# Patient Record
Sex: FEMALE | Race: ASIAN | NOT HISPANIC OR LATINO | URBAN - METROPOLITAN AREA
[De-identification: names, ages, dates, MRNs, and addresses within clinical notes are randomized per-mention and may not be internally consistent; named-entity substitution may affect disease eponyms.]

---

## 2023-05-27 ENCOUNTER — EMERGENCY (EMERGENCY)
Facility: HOSPITAL | Age: 38
LOS: 1 days | Discharge: HOME | End: 2023-05-27
Admitting: EMERGENCY MEDICINE
Payer: SELF-PAY

## 2023-05-27 VITALS
RESPIRATION RATE: 16 BRPM | SYSTOLIC BLOOD PRESSURE: 117 MMHG | DIASTOLIC BLOOD PRESSURE: 67 MMHG | TEMPERATURE: 98 F | OXYGEN SATURATION: 97 % | HEART RATE: 84 BPM

## 2023-05-27 VITALS
SYSTOLIC BLOOD PRESSURE: 115 MMHG | DIASTOLIC BLOOD PRESSURE: 80 MMHG | OXYGEN SATURATION: 100 % | TEMPERATURE: 98 F | RESPIRATION RATE: 16 BRPM | HEART RATE: 96 BPM

## 2023-05-27 LAB
FLUAV AG NPH QL: SIGNIFICANT CHANGE UP
FLUBV AG NPH QL: SIGNIFICANT CHANGE UP
RSV RNA NPH QL NAA+NON-PROBE: SIGNIFICANT CHANGE UP
SARS-COV-2 RNA SPEC QL NAA+PROBE: SIGNIFICANT CHANGE UP

## 2023-05-27 PROCEDURE — 71101 X-RAY EXAM UNILAT RIBS/CHEST: CPT | Mod: 26,LT

## 2023-05-27 PROCEDURE — 29125 APPL SHORT ARM SPLINT STATIC: CPT

## 2023-05-27 PROCEDURE — 73110 X-RAY EXAM OF WRIST: CPT | Mod: 26,RT

## 2023-05-27 PROCEDURE — 71046 X-RAY EXAM CHEST 2 VIEWS: CPT | Mod: 26

## 2023-05-27 PROCEDURE — 99284 EMERGENCY DEPT VISIT MOD MDM: CPT | Mod: 25

## 2023-05-27 RX ORDER — IBUPROFEN 200 MG
400 TABLET ORAL ONCE
Refills: 0 | Status: COMPLETED | OUTPATIENT
Start: 2023-05-27 | End: 2023-05-27

## 2023-05-27 RX ADMIN — Medication 400 MILLIGRAM(S): at 15:40

## 2023-05-27 NOTE — ED PROVIDER NOTE - OBJECTIVE STATEMENT
37yoF no PMH , current smoker, p/w R wrist pain s/p slip and fall yesterday. pt states that she was walking down the stairs, had 5 steps left when she subsequently fell, forward, landing on her R wrist and Left sided rib cage. Pt denies head trauma, LOC. pt ambulatory after incident. Denies SOB, chest pain. Also admits to fevers max temp 101.4F, sore throat, headache, myalgias and congestion. no known sick contacts. headaches are generalized, not worst headache of life, no associated neck stiffness or photophobia. no known sick contacts. denies dysuria, n/v/d/c, abd pain.

## 2023-05-27 NOTE — ED PROVIDER NOTE - PATIENT PORTAL LINK FT
You can access the FollowMyHealth Patient Portal offered by [facility] by registering at the following website: [email or web address]. By joining [employer]’s FollowMyHealth portal, you will also be able to view your health information using other applications (apps) compatible with our system.

## 2023-05-27 NOTE — ED PROVIDER NOTE - NSFOLLOWUPINSTRUCTIONS_ED_ALL_ED_FT
•Orthopedics referral  Wrist Sprain  WHAT YOU NEED TO KNOW:  A wrist sprain happens when one or more ligaments in your wrist stretch or tear. Ligaments are tough tissues that connect bones and keep them in place, and support your joints.  DISCHARGE INSTRUCTIONS:  Seek care immediately if:   •You have severe pain or swelling.  •Your injured wrist is red or has red streaks spreading from the injured area.  •You have new trouble moving your hands, fingers, or wrist.  •Your wrist, hand, or fingers feel cold or numb.  •Your fingernails turn blue or gray.  Call your doctor if:   •Your symptoms get worse.  •Your sprain does not get better within 2 weeks.   •You have questions or concerns about your condition or care.  Medicines: You may need any of the following:   •NSAIDs, such as ibuprofen, help decrease swelling, pain, and fever. NSAIDs can cause stomach bleeding or kidney problems in certain people. If you take blood thinner medicine, always ask your healthcare provider if NSAIDs are safe for you. Always read the medicine label and follow directions.  •Acetaminophen decreases pain and fever. It is available without a doctor's order. Ask how much to take and how often to take it. Follow directions. Read the labels of all other medicines you are using to see if they also contain acetaminophen, or ask your doctor or pharmacist. Acetaminophen can cause liver damage if not taken correctly. Do not use more than 4 grams (4,000 milligrams) total of acetaminophen in one day.   •Take your medicine as directed. Contact your healthcare provider if you think your medicine is not helping or if you have side effects. Tell him or her if you are allergic to any medicine. Keep a list of the medicines, vitamins, and herbs you take. Include the amounts, and when and why you take them. Bring the list or the pill bottles to follow-up visits. Carry your medicine list with you in case of an emergency.  Self-care:   •Rest your wrist for at least 48 hours. Avoid activities that cause pain.  •Ice your wrist for 15 to 20 minutes every hour or as directed. Use an ice pack, or put crushed ice in a plastic bag. Cover it with a towel before you put it on your wrist. Ice helps prevent tissue damage and decreases swelling and pain.  •Compress your wrist with an elastic bandage. This will help decrease swelling, support your wrist, and help it heal. Wear your wrist wrap as directed. The elastic bandage should be snug but not tight.  •Elevate your wrist above the level of your heart as often as you can. This will help decrease swelling and pain. Prop your wrist on pillows or blankets to keep it elevated comfortably.  Wrist support: You may need to wear a splint or cast to support your wrist and prevent more damage. Wear your splint as directed. Ask for instructions on how to bathe while you are wearing a splint or cast.  Physical therapy: Your healthcare provider may recommend that you go to physical therapy. A physical therapist teaches you exercises to help improve movement and strength, and to decrease pain.  Follow up with your doctor as directed: Write down your questions so you remember to ask them during your visits.    Esguince de nahomy    LO QUE NECESITA SABER:    Un esguince de nahomy se produce cuando edgardo o más ligamentos en hough nahomy se estiran o desgarran. Los ligamentos son tejidos fifi que conectan los huesos entre sí y los mantienen en hough lugar, y sostienen laurie articulaciones.    INSTRUCCIONES SOBRE EL JOSE HOSPITALARIA:  Busque atención médica de inmediato si:  •Usted tiene dolor o inflamación severos.  •La nahomy lesionada está enrojecida o tiene alexandra castrejon que se extienden desde el área lesionada.  •Le surge dificultad para  y usar las aida, los dedos o la nahomy.  •Hough brazo, mano o dedos están entumecidos.  •Laurie labios o uñas de las aida se tornan grises o azules.  Llame a hough médico si:  •Laurie síntomas empeoran.  •Hough tos no mejora al cabo de 2 semanas.  •Usted tiene preguntas o inquietudes acerca de hough condición o cuidado.  Medicamentos:Es posible que usted necesite alguno de los siguientes:   •Los YOUNG,maria g el ibuprofeno, ayudan a disminuir la inflamación, el dolor y la fiebre. Los YOUNG pueden causar sangrado estomacal o problemas renales en ciertas personas. Si usted clint un medicamento anticoagulante, siempre pregúntele a hough médico si los YOUNG son seguros para usted. Siempre bang la etiqueta de anya medicamento y siga las instrucciones.  •Acetaminofénalivia el dolor y baja la fiebre. Está disponible sin receta médica. Pregunte la cantidad y la frecuencia con que debe tomarlos. Siga las indicaciones. Bang las etiquetas de todos los demás medicamentos que esté usando para saber si también contienen acetaminofén, o pregunte a hough médico o farmacéutico. El acetaminofén puede causar daño en el hígado cuando no se clint de forma correcta. No use más de 4 gramos (4000 miligramos) en total de acetaminofeno en un día.  •[place] laurie medicamentos maria g se le haya indicado.Consulte con hough médico si usted darrick que hough medicamento no le está ayudando o si presenta efectos secundarios. Infórmele si es alérgico a cualquier medicamento. Mantenga rosa lista actualizada de los medicamentos, las vitaminas y los productos herbales que clint. Incluya los siguientes datos de los medicamentos: cantidad, frecuencia y motivo de administración. Traiga con usted la lista o los envases de las píldoras a laurie citas de seguimiento. Lleve la lista de los medicamentos con usted en sim de rosa emergencia.  Cuidados personales:  •El descansosu nahomy por lo menos por 48 horas. Evite actividades que le causen dolor.  •El hielosu nahomy de 15 a 20 minutos cada hora o maria g se le indique. Use rosa compresa de hielo o ponga hielo triturado en rosa bolsa de plástico. Envuelva el hielo con rosa toalla antes de colocarlo sobre la nahomy. El hielo ayuda a evitar daño al tejido y a disminuir la inflamación y el dolor.  •Compresiónsu nahomy con rosa venda elástica. Diamond ayudará a bajar la inflamación, armida apoyo a la nahomy y contribuir a que sane. Use hough muñequera maria g se le indique. El vendaje elástico debe estar ajustado jarod no apretado.  •Elevesu nahomy por encima del nivel de hough corazón con la mayor frecuencia posible. Diamond va a disminuir inflamación y el dolor. Apoye hough nahomy sobre almohadas o mantas para mantenerla elevada cómodamente.  Soporte para la nahomy:Es posible que necesite usar rosa férula o un yeso para apoyar hough nahomy y evitar más daño. Use hough férula maria g se le indique. Pida instrucciones sobre cómo bañarse mientras que usted está usando rosa férula o yeso.  Fisioterapia:Houhg médico podría recomendar que usted vaya a fisioterapia. Un fisioterapeuta le puede enseñar ejercicios para ayudarle a mejorar el movimiento y la fuerza, y para disminuir el dolor.  Acuda a la consulta de control con hough médico según las indicaciones:Anote laurie preguntas para que se acuerde de hacerlas evaristo laurie visitas.

## 2023-05-27 NOTE — ED PROVIDER NOTE - CLINICAL SUMMARY MEDICAL DECISION MAKING FREE TEXT BOX
37yoF no PMH , current smoker, p/w R wrist pain s/p slip and fall yesterday. pt states that she was walking down the stairs, had 5 steps left when she subsequently fell, forward, landing on her R wrist and Left sided rib cage. Pt denies head trauma, LOC. pt ambulatory after incident. Denies SOB, chest pain. Also admits to fevers max temp 101.4F, sore throat, headache, myalgias and congestion. no known sick contacts. headaches are generalized, not worst headache of life, no associated neck stiffness or photophobia. no known sick contacts    + ttp R Radius  + ttp of L mid axilary region ttp  will order xrays R wrist/forearm, ribs and chest to r/o PNA In setting of cigarette smoker nad fevers  if above w/u negatvie will dc.

## 2023-05-27 NOTE — ED ADULT TRIAGE NOTE - CHIEF COMPLAINT QUOTE
C/O right wrist pain post slip and fall down 5 stairs yesterday, endorses left shoulder pain. no deformity noted to the right wrist. no pertinent medical hx. pt comfortable in appearance, no indicators of pain noted. denies head injury, LOC, dizziness, chest pain

## 2023-05-27 NOTE — ED PROVIDER NOTE - PHYSICAL EXAMINATION
CONSTITUTIONAL: Well-appearing; well-nourished; in no apparent distress;  HEAD: Normocephalic, atraumatic;  EYES: PERRL, EOM intact, conjunctiva and sclera WNL;  ENT: normal nose; no rhinorrhea; unremarkable pharynx  NECK/LYMPH: Supple; non-tender;  CARD: Normal S1, S2; no murmurs, rubs, or gallops noted  RESP: Normal chest excursion with respiration; breath sounds clear and equal bilaterally; no wheezes, rhonchi, or rales noted  ABD/GI: soft, non-distended; non-tender; no palpable organomegaly, no pulsatile mass  EXT/MS: no visible deformity. negative snuffbox ttp. + R wrist ttp. + R ttp along radius to forearm. negative olecranon or epicondylar ttp. FROM. moves all extremities; distal pulses are normal, no pedal edema  SKIN: Normal for age and race; warm; dry; good turgor; no apparent lesions or exudate noted  NEURO: Awake, alert, oriented x 3, no gross deficits, CN II-XII grossly intact, no motor or sensory deficit noted  PSYCH: Normal mood; appropriate affect

## 2023-05-27 NOTE — ED ADULT NURSE NOTE - OBJECTIVE STATEMENT
pt to ED c/o R wrist pain, L shoulder pain s/p falling down the stairs yesterday. pt denies head strike, LOC, anticoagulation use, ha. neuro intact. pt endorsing mild numbess/tingling to R hand, +radial pulse. respirations even, nonlabored on RA. NAD noted at this time. pt pending XR.

## 2023-05-27 NOTE — ED ADULT NURSE NOTE - NSFALLRISKINTERV_ED_ALL_ED
